# Patient Record
Sex: MALE | Race: BLACK OR AFRICAN AMERICAN | URBAN - METROPOLITAN AREA
[De-identification: names, ages, dates, MRNs, and addresses within clinical notes are randomized per-mention and may not be internally consistent; named-entity substitution may affect disease eponyms.]

---

## 2017-07-19 ENCOUNTER — ED HISTORICAL/CONVERTED ENCOUNTER (OUTPATIENT)
Dept: OTHER | Age: 62
End: 2017-07-19

## 2017-09-07 ENCOUNTER — ED HISTORICAL/CONVERTED ENCOUNTER (OUTPATIENT)
Dept: OTHER | Age: 62
End: 2017-09-07

## 2022-11-03 ENCOUNTER — APPOINTMENT (OUTPATIENT)
Dept: GENERAL RADIOLOGY | Age: 67
End: 2022-11-03
Attending: STUDENT IN AN ORGANIZED HEALTH CARE EDUCATION/TRAINING PROGRAM
Payer: OTHER GOVERNMENT

## 2022-11-03 ENCOUNTER — APPOINTMENT (OUTPATIENT)
Dept: CT IMAGING | Age: 67
End: 2022-11-03
Attending: STUDENT IN AN ORGANIZED HEALTH CARE EDUCATION/TRAINING PROGRAM
Payer: OTHER GOVERNMENT

## 2022-11-03 ENCOUNTER — HOSPITAL ENCOUNTER (OUTPATIENT)
Age: 67
Setting detail: OBSERVATION
Discharge: HOME | End: 2022-11-04
Attending: STUDENT IN AN ORGANIZED HEALTH CARE EDUCATION/TRAINING PROGRAM | Admitting: INTERNAL MEDICINE
Payer: OTHER GOVERNMENT

## 2022-11-03 ENCOUNTER — APPOINTMENT (OUTPATIENT)
Dept: NON INVASIVE DIAGNOSTICS | Age: 67
End: 2022-11-03
Attending: STUDENT IN AN ORGANIZED HEALTH CARE EDUCATION/TRAINING PROGRAM
Payer: OTHER GOVERNMENT

## 2022-11-03 DIAGNOSIS — I48.0 PAROXYSMAL ATRIAL FIBRILLATION (HCC): Primary | ICD-10-CM

## 2022-11-03 PROBLEM — I48.91 ATRIAL FIBRILLATION WITH RAPID VENTRICULAR RESPONSE (HCC): Status: ACTIVE | Noted: 2022-11-03

## 2022-11-03 LAB
ALBUMIN SERPL-MCNC: 3.4 G/DL (ref 3.5–5)
ALBUMIN/GLOB SERPL: 1 {RATIO} (ref 1.1–2.2)
ALP SERPL-CCNC: 77 U/L (ref 45–117)
ALT SERPL-CCNC: 25 U/L (ref 12–78)
ANION GAP SERPL CALC-SCNC: 4 MMOL/L (ref 5–15)
AST SERPL W P-5'-P-CCNC: 24 U/L (ref 15–37)
ATRIAL RATE: 131 BPM
BASOPHILS # BLD: 0 K/UL (ref 0–0.1)
BASOPHILS NFR BLD: 1 % (ref 0–1)
BILIRUB SERPL-MCNC: 0.2 MG/DL (ref 0.2–1)
BUN SERPL-MCNC: 20 MG/DL (ref 6–20)
BUN/CREAT SERPL: 19 (ref 12–20)
CA-I BLD-MCNC: 8.9 MG/DL (ref 8.5–10.1)
CALCULATED R AXIS, ECG10: 12 DEGREES
CALCULATED T AXIS, ECG11: -149 DEGREES
CHLORIDE SERPL-SCNC: 104 MMOL/L (ref 97–108)
CK SERPL-CCNC: 149 U/L (ref 39–308)
CO2 SERPL-SCNC: 31 MMOL/L (ref 21–32)
CREAT SERPL-MCNC: 1.06 MG/DL (ref 0.7–1.3)
D DIMER PPP FEU-MCNC: 5.08 UG/ML(FEU)
DIAGNOSIS, 93000: NORMAL
DIFFERENTIAL METHOD BLD: ABNORMAL
EOSINOPHIL # BLD: 0.2 K/UL (ref 0–0.4)
EOSINOPHIL NFR BLD: 3 % (ref 0–7)
ERYTHROCYTE [DISTWIDTH] IN BLOOD BY AUTOMATED COUNT: 14.7 % (ref 11.5–14.5)
GLOBULIN SER CALC-MCNC: 3.5 G/DL (ref 2–4)
GLUCOSE SERPL-MCNC: 127 MG/DL (ref 65–100)
HCT VFR BLD AUTO: 39.6 % (ref 36.6–50.3)
HGB BLD-MCNC: 13.2 G/DL (ref 12.1–17)
IMM GRANULOCYTES # BLD AUTO: 0 K/UL (ref 0–0.04)
IMM GRANULOCYTES NFR BLD AUTO: 0 % (ref 0–0.5)
INR PPP: 1 (ref 0.9–1.1)
LYMPHOCYTES # BLD: 2.3 K/UL (ref 0.8–3.5)
LYMPHOCYTES NFR BLD: 42 % (ref 12–49)
MAGNESIUM SERPL-MCNC: 2.2 MG/DL (ref 1.6–2.4)
MCH RBC QN AUTO: 31.7 PG (ref 26–34)
MCHC RBC AUTO-ENTMCNC: 33.3 G/DL (ref 30–36.5)
MCV RBC AUTO: 95.2 FL (ref 80–99)
MONOCYTES # BLD: 0.7 K/UL (ref 0–1)
MONOCYTES NFR BLD: 13 % (ref 5–13)
NEUTS SEG # BLD: 2.3 K/UL (ref 1.8–8)
NEUTS SEG NFR BLD: 41 % (ref 32–75)
NRBC # BLD: 0 K/UL (ref 0–0.01)
NRBC BLD-RTO: 0 PER 100 WBC
PLATELET # BLD AUTO: 324 K/UL (ref 150–400)
PMV BLD AUTO: 9.5 FL (ref 8.9–12.9)
POTASSIUM SERPL-SCNC: 4.1 MMOL/L (ref 3.5–5.1)
PROT SERPL-MCNC: 6.9 G/DL (ref 6.4–8.2)
PROTHROMBIN TIME: 13.6 SEC (ref 11.9–14.6)
Q-T INTERVAL, ECG07: 278 MS
QRS DURATION, ECG06: 100 MS
QTC CALCULATION (BEZET), ECG08: 410 MS
RBC # BLD AUTO: 4.16 M/UL (ref 4.1–5.7)
SODIUM SERPL-SCNC: 139 MMOL/L (ref 136–145)
TROPONIN-HIGH SENSITIVITY: 72 NG/L (ref 0–76)
TROPONIN-HIGH SENSITIVITY: 74 NG/L (ref 0–76)
VENTRICULAR RATE, ECG03: 131 BPM
WBC # BLD AUTO: 5.6 K/UL (ref 4.1–11.1)

## 2022-11-03 PROCEDURE — 96372 THER/PROPH/DIAG INJ SC/IM: CPT

## 2022-11-03 PROCEDURE — 80053 COMPREHEN METABOLIC PANEL: CPT

## 2022-11-03 PROCEDURE — 93970 EXTREMITY STUDY: CPT

## 2022-11-03 PROCEDURE — 84484 ASSAY OF TROPONIN QUANT: CPT

## 2022-11-03 PROCEDURE — 83735 ASSAY OF MAGNESIUM: CPT

## 2022-11-03 PROCEDURE — 71275 CT ANGIOGRAPHY CHEST: CPT

## 2022-11-03 PROCEDURE — 96360 HYDRATION IV INFUSION INIT: CPT

## 2022-11-03 PROCEDURE — 82550 ASSAY OF CK (CPK): CPT

## 2022-11-03 PROCEDURE — 85610 PROTHROMBIN TIME: CPT

## 2022-11-03 PROCEDURE — 36415 COLL VENOUS BLD VENIPUNCTURE: CPT

## 2022-11-03 PROCEDURE — G0378 HOSPITAL OBSERVATION PER HR: HCPCS

## 2022-11-03 PROCEDURE — 71045 X-RAY EXAM CHEST 1 VIEW: CPT

## 2022-11-03 PROCEDURE — 85025 COMPLETE CBC W/AUTO DIFF WBC: CPT

## 2022-11-03 PROCEDURE — 85379 FIBRIN DEGRADATION QUANT: CPT

## 2022-11-03 PROCEDURE — 96361 HYDRATE IV INFUSION ADD-ON: CPT

## 2022-11-03 PROCEDURE — 93005 ELECTROCARDIOGRAM TRACING: CPT

## 2022-11-03 PROCEDURE — 74011250636 HC RX REV CODE- 250/636: Performed by: STUDENT IN AN ORGANIZED HEALTH CARE EDUCATION/TRAINING PROGRAM

## 2022-11-03 PROCEDURE — 74011000636 HC RX REV CODE- 636: Performed by: STUDENT IN AN ORGANIZED HEALTH CARE EDUCATION/TRAINING PROGRAM

## 2022-11-03 PROCEDURE — 74011000250 HC RX REV CODE- 250: Performed by: STUDENT IN AN ORGANIZED HEALTH CARE EDUCATION/TRAINING PROGRAM

## 2022-11-03 PROCEDURE — 74011250637 HC RX REV CODE- 250/637: Performed by: STUDENT IN AN ORGANIZED HEALTH CARE EDUCATION/TRAINING PROGRAM

## 2022-11-03 PROCEDURE — 99285 EMERGENCY DEPT VISIT HI MDM: CPT

## 2022-11-03 RX ORDER — ACETAMINOPHEN 650 MG/1
650 SUPPOSITORY RECTAL
Status: DISCONTINUED | OUTPATIENT
Start: 2022-11-03 | End: 2022-11-04 | Stop reason: HOSPADM

## 2022-11-03 RX ORDER — SODIUM CHLORIDE 0.9 % (FLUSH) 0.9 %
5-40 SYRINGE (ML) INJECTION EVERY 8 HOURS
Status: DISCONTINUED | OUTPATIENT
Start: 2022-11-03 | End: 2022-11-04 | Stop reason: HOSPADM

## 2022-11-03 RX ORDER — SODIUM CHLORIDE 0.9 % (FLUSH) 0.9 %
5-40 SYRINGE (ML) INJECTION AS NEEDED
Status: DISCONTINUED | OUTPATIENT
Start: 2022-11-03 | End: 2022-11-04 | Stop reason: HOSPADM

## 2022-11-03 RX ORDER — LISINOPRIL 20 MG/1
20 TABLET ORAL DAILY
Status: DISCONTINUED | OUTPATIENT
Start: 2022-11-04 | End: 2022-11-04 | Stop reason: HOSPADM

## 2022-11-03 RX ORDER — METOPROLOL TARTRATE 50 MG/1
50 TABLET ORAL DAILY
Status: DISCONTINUED | OUTPATIENT
Start: 2022-11-03 | End: 2022-11-04

## 2022-11-03 RX ORDER — ENOXAPARIN SODIUM 100 MG/ML
40 INJECTION SUBCUTANEOUS DAILY
Status: DISCONTINUED | OUTPATIENT
Start: 2022-11-03 | End: 2022-11-04 | Stop reason: HOSPADM

## 2022-11-03 RX ORDER — BENZONATATE 100 MG/1
100 CAPSULE ORAL
COMMUNITY

## 2022-11-03 RX ORDER — BENZONATATE 100 MG/1
100 CAPSULE ORAL
Status: DISCONTINUED | OUTPATIENT
Start: 2022-11-03 | End: 2022-11-04 | Stop reason: HOSPADM

## 2022-11-03 RX ORDER — ONDANSETRON 2 MG/ML
4 INJECTION INTRAMUSCULAR; INTRAVENOUS
Status: DISCONTINUED | OUTPATIENT
Start: 2022-11-03 | End: 2022-11-04 | Stop reason: HOSPADM

## 2022-11-03 RX ORDER — POLYETHYLENE GLYCOL 3350 17 G/17G
17 POWDER, FOR SOLUTION ORAL DAILY PRN
Status: DISCONTINUED | OUTPATIENT
Start: 2022-11-03 | End: 2022-11-04 | Stop reason: HOSPADM

## 2022-11-03 RX ORDER — HYDRALAZINE HYDROCHLORIDE 20 MG/ML
20 INJECTION INTRAMUSCULAR; INTRAVENOUS
Status: DISCONTINUED | OUTPATIENT
Start: 2022-11-03 | End: 2022-11-04 | Stop reason: HOSPADM

## 2022-11-03 RX ORDER — ACETAMINOPHEN 325 MG/1
650 TABLET ORAL
Status: DISCONTINUED | OUTPATIENT
Start: 2022-11-03 | End: 2022-11-04 | Stop reason: HOSPADM

## 2022-11-03 RX ORDER — METOPROLOL TARTRATE 50 MG/1
50 TABLET ORAL
COMMUNITY
Start: 2022-09-09 | End: 2022-11-04

## 2022-11-03 RX ORDER — LISINOPRIL 40 MG/1
20 TABLET ORAL
COMMUNITY
Start: 2022-09-09

## 2022-11-03 RX ORDER — DILTIAZEM HYDROCHLORIDE 5 MG/ML
5 INJECTION INTRAVENOUS
Status: ACTIVE | OUTPATIENT
Start: 2022-11-03 | End: 2022-11-04

## 2022-11-03 RX ORDER — ONDANSETRON 4 MG/1
4 TABLET, ORALLY DISINTEGRATING ORAL
Status: DISCONTINUED | OUTPATIENT
Start: 2022-11-03 | End: 2022-11-04 | Stop reason: HOSPADM

## 2022-11-03 RX ADMIN — SODIUM CHLORIDE 1000 ML: 9 INJECTION, SOLUTION INTRAVENOUS at 03:14

## 2022-11-03 RX ADMIN — ENOXAPARIN SODIUM 40 MG: 100 INJECTION SUBCUTANEOUS at 10:13

## 2022-11-03 RX ADMIN — IOPAMIDOL 100 ML: 755 INJECTION, SOLUTION INTRAVENOUS at 07:31

## 2022-11-03 RX ADMIN — SODIUM CHLORIDE, PRESERVATIVE FREE 10 ML: 5 INJECTION INTRAVENOUS at 22:00

## 2022-11-03 RX ADMIN — ACETAMINOPHEN 650 MG: 325 TABLET ORAL at 10:13

## 2022-11-03 RX ADMIN — METOPROLOL TARTRATE 50 MG: 50 TABLET, FILM COATED ORAL at 13:57

## 2022-11-03 RX ADMIN — SODIUM CHLORIDE, PRESERVATIVE FREE 10 ML: 5 INJECTION INTRAVENOUS at 08:50

## 2022-11-03 RX ADMIN — SODIUM CHLORIDE 1000 ML: 9 INJECTION, SOLUTION INTRAVENOUS at 06:05

## 2022-11-03 RX ADMIN — SODIUM CHLORIDE, PRESERVATIVE FREE 10 ML: 5 INJECTION INTRAVENOUS at 20:34

## 2022-11-03 NOTE — CONSULTS
Consult    NAME: Kwame Ely   :  1955   MRN:  [id number]     Date/Time:  11/3/2022 1:36 PM    Patient PCP: Jennifer Pereyra MD  ________________________________________________________________________      Subjective:   CHIEF COMPLAINT: Palpitations. HISTORY OF PRESENT ILLNESS:   Patient stated that he was asleep and I experience pounding and fluttering and was aware that this could be his atrial fibrillation as he had a similar episode about a year and a half ago and was seen at [facility].  He was given Eliquis but he had a GI bleed and therefore Eliquis has been discontinued. He is fairly active person and denies any chest tightness motor deficit nausea vomiting or diaphoresis. Past Medical History:   Diagnosis Date    A-fib [facility])     Chronic obstructive pulmonary disease ([facility] [address]     Hypertension       History reviewed. No pertinent surgical history. No Known Allergies   Meds:  See below  Social History     Tobacco Use    Smoking status: Some Days     Types: Cigarettes    Smokeless tobacco: Never   Substance Use Topics    Alcohol use: Not Currently   Patient smokes half pack a day and takes a very seldom social drink and does use marijuana. History reviewed. No pertinent family history. FAMILY HISTORY: Mother  at the age of 79 and she had diabetes and had some kind of back surgery and subsequently she . He did not know much about his father. PERSONAL HISTORY : Patient is  and lives with a girlfriend and that he has 7 children from 5 PM and and he works as a .     REVIEW OF SYSTEMS:     []         Unable to obtain  ROS due to ---   [x]         Total of 12 systems reviewed as follows:    Constitutional: negative fever, negative chills, negative weight loss  Eyes:   negative visual changes  ENT:   negative sore throat, tongue or lip swelling  Respiratory:  negative cough, negative dyspnea  Cards:              negative for chest pain, positive for palpitations,   GI:   negative for nausea, vomiting, diarrhea, and abdominal pain  Genitourinary: negative for frequency, dysuria  Integument:  negative for rash   Hematologic:  negative for easy bruising and gum/nose bleeding  Musculoskel: negative for myalgias,  back pain  Neurological:  negative for headaches, dizziness, vertigo, weakness  Behavl/Psych: negative for feelings of anxiety, depression     Pertinent Positives include :    Objective:      Physical Exam:    Last 24hrs VS reviewed since prior progress note. Most recent are:    Visit Vitals  BP (!) 142/82 (BP 1 Location: Right upper arm, BP Patient Position: At rest)   Pulse 93   Temp 98.1 °F (36.7 °C)   Resp 17   Ht 5' 7\" (1.702 m)   Wt 77.1 kg (170 lb)   SpO2 97%   BMI 26.63 kg/m²       Intake/Output Summary (Last 24 hours) at 11/3/2022 1336  Last data filed at 11/3/2022 0414  Gross per 24 hour   Intake 1000 ml   Output --   Net 1000 ml        General Appearance: Well developed, well nourished, alert & oriented x 3,    no acute distress. Ears/Nose/Mouth/Throat: Pupils equal and round, Hearing grossly normal.  Neck: Supple. JVP within normal limits. Carotids good upstrokes, with no bruit. Chest: Lungs clear to auscultation bilaterally. Cardiovascular: Regular rate and rhythm, S1S2 normal, no murmur, rubs, gallops. Abdomen: Soft, non-tender, bowel sounds are active. No organomegaly. Extremities: No edema bilaterally. Femoral pulses +2, Distal Pulses +1. Skin: Warm and dry. Neuro: CN II-XII grossly intact, Strength and sensation grossly intact. []         Post-cath site without hematoma, bruit, tenderness, or thrill. Distal pulses intact. Data:      Telemetry: Sinus tachycardia    EKG:  []  No new EKG for review. Prior to Admission medications    Medication Sig Start Date End Date Taking?  Authorizing Provider   lisinopriL (PRINIVIL, ZESTRIL) 40 mg tablet 20 mg. 9/9/22  Yes Other, MD Puneet   metoprolol tartrate (LOPRESSOR) 50 mg tablet 50 mg. 9/9/22  Yes Other, MD Puneet   benzonatate (TESSALON) 100 mg capsule Take 100 mg by mouth three (3) times daily as needed for Cough. Yes Other, MD Puneet       Recent Results (from the past 24 hour(s))   EKG, 12 LEAD, INITIAL    Collection Time: 11/03/22  2:57 AM   Result Value Ref Range    Ventricular Rate 131 BPM    Atrial Rate 131 BPM    QRS Duration 100 ms    Q-T Interval 278 ms    QTC Calculation (Bezet) 410 ms    Calculated R Axis 12 degrees    Calculated T Axis -149 degrees    Diagnosis       Atrial fibrillation with rapid ventricular response  Voltage criteria for left ventricular hypertrophy  Cannot rule out Septal infarct , age undetermined  ST & T wave abnormality, consider inferolateral ischemia  Abnormal ECG  When compared with ECG of 07-SEP-2017 13:32,  Atrial fibrillation has replaced Sinus rhythm  Vent. rate has increased BY  44 BPM  Minimal criteria for Septal infarct are now Present  ST now depressed in Inferior leads  Confirmed by DORIAN FINCH, [facility] ([id number]) on 11/3/2022 12:32:49 PM     CBC WITH AUTOMATED DIFF    Collection Time: 11/03/22  3:09 AM   Result Value Ref Range    WBC 5.6 4.1 - 11.1 K/uL    RBC 4.16 4.10 - 5.70 M/uL    HGB 13.2 12.1 - 17.0 g/dL    HCT 39.6 36.6 - 50.3 %    MCV 95.2 80.0 - 99.0 FL    MCH 31.7 26.0 - 34.0 PG    MCHC 33.3 30.0 - 36.5 g/dL    RDW 14.7 (H) 11.5 - 14.5 %    PLATELET 835 468 - 173 K/uL    MPV 9.5 8.9 - 12.9 FL    NRBC 0.0 0.0  WBC    ABSOLUTE NRBC 0.00 0.00 - 0.01 K/uL    NEUTROPHILS 41 32 - 75 %    LYMPHOCYTES 42 12 - 49 %    MONOCYTES 13 5 - 13 %    EOSINOPHILS 3 0 - 7 %    BASOPHILS 1 0 - 1 %    IMMATURE GRANULOCYTES 0 0 - 0.5 %    ABS. NEUTROPHILS 2.3 1.8 - 8.0 K/UL    ABS. LYMPHOCYTES 2.3 0.8 - 3.5 K/UL    ABS. MONOCYTES 0.7 0.0 - 1.0 K/UL    ABS. EOSINOPHILS 0.2 0.0 - 0.4 K/UL    ABS. BASOPHILS 0.0 0.0 - 0.1 K/UL    ABS. IMM.  GRANS. 0.0 0.00 - 0.04 K/UL    DF AUTOMATED     METABOLIC PANEL, COMPREHENSIVE    Collection Time: 11/03/22  3:09 AM   Result Value Ref Range    Sodium 139 136 - 145 mmol/L    Potassium 4.1 3.5 - 5.1 mmol/L    Chloride 104 97 - 108 mmol/L    CO2 31 21 - 32 mmol/L    Anion gap 4 (L) 5 - 15 mmol/L    Glucose 127 (H) 65 - 100 mg/dL    BUN 20 6 - 20 mg/dL    Creatinine 1.06 0.70 - 1.30 mg/dL    BUN/Creatinine ratio 19 12 - 20      eGFR >60 >60 ml/min/1.73m2    Calcium 8.9 8.5 - 10.1 mg/dL    Bilirubin, total 0.2 0.2 - 1.0 mg/dL    AST (SGOT) 24 15 - 37 U/L    ALT (SGPT) 25 12 - 78 U/L    Alk. phosphatase 77 45 - 117 U/L    Protein, total 6.9 6.4 - 8.2 g/dL    Albumin 3.4 (L) 3.5 - 5.0 g/dL    Globulin 3.5 2.0 - 4.0 g/dL    A-G Ratio 1.0 (L) 1.1 - 2.2     TROPONIN-HIGH SENSITIVITY    Collection Time: 11/03/22  3:09 AM   Result Value Ref Range    Troponin-High Sensitivity 74 0 - 76 ng/L   MAGNESIUM    Collection Time: 11/03/22  3:09 AM   Result Value Ref Range    Magnesium 2.2 1.6 - 2.4 mg/dL   TROPONIN-HIGH SENSITIVITY    Collection Time: 11/03/22  4:54 AM   Result Value Ref Range    Troponin-High Sensitivity 72 0 - 76 ng/L   D DIMER    Collection Time: 11/03/22  6:11 AM   Result Value Ref Range    D DIMER 5.08 (H) <0.50 ug/ml(FEU)   CK    Collection Time: 11/03/22  9:02 AM   Result Value Ref Range     39 - 308 U/L   PROTHROMBIN TIME + INR    Collection Time: 11/03/22  9:02 AM   Result Value Ref Range    Prothrombin time 13.6 11.9 - 14.6 sec    INR 1.0 0.9 - 1.1           Assessment:   Patient has had an episode of atrial fibrillation with rapid ventricular response but now is converted to sinus rhythm. History of atrial fibrillation and had a GI bleed and therefore is not on anticoagulation. Hypertension. COPD. Tobacco use. Plan:   I will check echocardiogram and then serial EKG and enzymes. I discussed with the patient about option of atrial fibrillation ablation and subsequently watchman device insertion and patient is interested and is willing to consider that.   I discussed with electrophysiologist Dr. Junito Escalona and I will consult to him. Thank you.         []        High complexity decision making was performed    John Christensen MD

## 2022-11-03 NOTE — PROGRESS NOTES
Reason for Admission:  Paroxysmal AFIB                     RUR Score:    N/A                 Plan for utilizing home health:  Not at this time        PCP: First and Last name: Merced Pittman MD     Name of Practice:    Are you a current patient: Yes/No:    Approximate date of last visit: 2 months ago   Can you participate in a virtual visit with your PCP:                     Current Advanced Directive/Advance Care Plan: Full Code      Healthcare Decision Maker:   Click here to complete [address] including selection of the Healthcare Decision Maker Relationship (ie \"Primary\")           Vazquez Flores, wife, [phone]                  Transition of Care Plan:     Met f/f with Pt, he confirmed that the information on the face sheet is correct. Pt stated that he lives with his wife. Pt stated no HH, no DME and independent with ADL. Pt stated that he uses the [facility]. Pt stated that family will take him home when he is D/C. CM dispo: home with no needs at this time.

## 2022-11-03 NOTE — PROGRESS NOTES
SON)/ [place] Outpatient Observation Notice (Mike Torres) provided to patient/representative with verbal explanation of the notice. Time allotted for questions regarding the notice. Patient /representative provided a completed copy of the SON/VOON notice. Copy placed on bedside chart.

## 2022-11-03 NOTE — ED TRIAGE NOTES
Chest pain with shortness of breath started 0200 after eating a popcicle. Afib with rvr for ems. Hx of  afib .  Pt alert and oriented

## 2022-11-03 NOTE — ED PROVIDER NOTES
[facility]  EMERGENCY DEPARTMENT ENCOUNTER NOTE        Date: 11/3/2022  Patient Name: Ramonita Ward      History of Presenting Illness     Chief Complaint   Patient presents with    Chest Pain    Shortness of Breath       History Provided By: Patient    HPI: Ramonita Ward, 79 y.o. male with a history of paroxysmal atrial fibrillation, COPD, and HTN who comes to the ED with palpitation. Patient reports that he went to the [place] today and was seen by his cardiologist and had negative work-up including EKG and echo. He went home and today suddenly started having palpitation. Following that around 2 AM started having chest pain shortness of breath that has resolved. Last time he had something similar is over a year ago. Moderate in severity, no known trigger, aggravating relieving factors without association symptoms. He is denying recent illnesses, nausea, vomiting, or diarrhea. There are no other complaints, changes, or physical findings at this time. PCP: No primary care provider on file. Past History     Past Medical History:  Past Medical History:   Diagnosis Date    A-fib ([facility] [address]     Chronic obstructive pulmonary disease ([facility] [address]     Hypertension        Past Surgical History:  History reviewed. No pertinent surgical history. Family History:  History reviewed. No pertinent family history. Social History:  Social History     Tobacco Use    Smoking status: Some Days     Types: Cigarettes    Smokeless tobacco: Never   Substance Use Topics    Alcohol use: Not Currently    Drug use: Yes     Types: Marijuana       Allergies:  No Known Allergies      Review of Systems     Review of Systems    A 10 point review of system was performed and was negative except as noted above in HPI    Physical Exam     Physical Exam  Vitals and nursing note reviewed. Constitutional:       General: He is not in acute distress.      Appearance: He is not ill-appearing, toxic-appearing or diaphoretic. HENT:      Head: Normocephalic and atraumatic. Cardiovascular:      Rate and Rhythm: Tachycardia present. Rhythm irregular. Heart sounds: Normal heart sounds. Pulmonary:      Effort: Pulmonary effort is normal.      Breath sounds: Normal breath sounds. Abdominal:      Palpations: Abdomen is soft. Tenderness: There is no abdominal tenderness. Musculoskeletal:      Cervical back: Normal range of motion and neck supple. Right lower leg: No tenderness. No edema. Left lower leg: No tenderness. No edema. Skin:     General: Skin is warm and dry. Neurological:      Mental Status: He is alert and oriented to person, place, and time. Lab and Diagnostic Study Results     Labs -   No results found for this or any previous visit (from the past 12 hour(s)). Radiologic Studies -   [unfilled]  CT Results  (Last 48 hours)      None          CXR Results  (Last 48 hours)      None            Medical Decision Making and ED Course   - I am the first and primary provider for this patient AND AM THE PRIMARY PROVIDER OF RECORD. - I reviewed the vital signs, available nursing notes, past medical history, past surgical history, family history and social history. - Initial assessment performed. The patients presenting problems have been discussed, and the staff are in agreement with the care plan formulated and outlined with them. I have encouraged them to ask questions as they arise throughout their visit. Vital Signs-Reviewed the patient's vital signs. Patient Vitals for the past 24 hrs:   Temp Pulse Resp BP SpO2   11/03/22 0302 98.1 °F (36.7 °C) (!) 140 22 (!) 162/143 96 %       Records Reviewed: Nursing Notes    Provider Notes (Medical Decision Making):       ED Course as of 11/03/22 0821   Thu Nov 03, 2022   0300 Patient is presented to the ED with atrial fibrillation.   He does have history of paroxysmal A. fib in the past.  Is currently on metoprolol but is not on anticoagulation. Reportedly he was seen by his cardiologist today and had work-up which was negative. Coming with chest pain and shortness of breath we will get CBC, chemistry, troponin, magnesium, and chest x-ray. We will contact the patient cardiac monitor and reassess. [AA]   0305 EKG was done at 2:57 AM interpreted by me as atrial fibrillation with RVR rate 131, indeterminate SC, QRS and QTc within normal, normal axis, no specific ST elevation or depression, no signs of blocks [AA]   0620 Work-up so far and is negative. We will get D-dimer given the A. fib to rule out PE. [AA]   0800 D-dimer was elevated. Consequently CTA was obtained which was negative. Discussed with cardiologist.  Patient will be observed in the hospital. Natanael Idalia      ED Course User Index  [AA] Flora Mckeon MD         Procedures and Critical Care       Performed by: Elizabeth Cuevas MD  PROCEDURES:  Critical Care  Performed by: Flora Mckeon MD  Authorized by: Flora Mckeon MD     Critical care provider statement:     Critical care time (minutes):  35    Critical care time was exclusive of:  Separately billable procedures and treating other patients    Critical care was necessary to treat or prevent imminent or life-threatening deterioration of the following conditions: Dysrhythmia and dehydration.     Critical care was time spent personally by me on the following activities:  Development of treatment plan with patient or surrogate, discussions with consultants, evaluation of patient's response to treatment, examination of patient, obtaining history from patient or surrogate, ordering and performing treatments and interventions, ordering and review of laboratory studies, ordering and review of radiographic studies and re-evaluation of patient's condition    I assumed direction of critical care for this patient from another provider in my specialty: no      Care discussed with: admitting provider Diagnosis     Clinical Impression: No diagnosis found. Disposition     Disposition: Condition improved          Attestations: Carol Ellington MD    Please note that this dictation was completed with [employer], the computer voice recognition software. Quite often unanticipated grammatical, syntax, homophones, and other interpretive errors are inadvertently transcribed by the computer software. Please disregard these errors. Please excuse any errors that have escaped final proofreading. Thank you.

## 2022-11-03 NOTE — ED NOTES
Spoke with wife regarding patient status after verbal consent from patient given, she requests to be called with up dated plan of care.     Myra Morris [phone]

## 2022-11-03 NOTE — H&P
History and Physical    Patient: Guadalupe Merida MRN: [id number]  SSN: [id number]    YOB: 1955  Age: 79 y.o. Sex: male      Subjective:      Guadalupe Merida is a 79 y.o. male with a PMHx of atrial fibrillation not on AC, COPD, hypertension, and hx GI bleed required multiple transfusions presenting to the ED with a primary complaint of palpitations. Patient states symptoms began at approximately 0200 AM this morning. He reports associated diaphoresis and lightheadedness. Patient follows with the [place] and was evaluated by his cardiologist yesterday. He states an EKG and echocardiogram performed at the time were unremakble. Patient reports similar episode of palpitations about 1.5 years ago and states \"my heart was in fibrillation. \" Patient states he came to our hospital at the time and was cardioverted. Old records of encounter are not available. Denies any fever, chills, dizziness, visual changes, cough, SOB, chest pain, N/V, constipation, diarrhea, abdominal pain, or lower extremity edema. In the ED, tachycardic with HR in 140s and hypertensive. EKG showing atrial fibrillation with RVR. Troponin negative x2. Patient spontaneously converted into normal sinus rhythm in the ED. Significant labs showing d-dimer of 5.08. CTA chest negative for pulmonary embolism or any other acute process. Patient admitted for further workup. Past Medical History:   Diagnosis Date    A-fib [facility])     Chronic obstructive pulmonary disease ([facility] [address]     Hypertension      History reviewed. No pertinent surgical history. History reviewed. No pertinent family history. Social History     Tobacco Use    Smoking status: Some Days     Types: Cigarettes    Smokeless tobacco: Never   Substance Use Topics    Alcohol use: Not Currently      Prior to Admission medications    Medication Sig Start Date End Date Taking?  Authorizing Provider   lisinopriL (PRINIVIL, ZESTRIL) 40 mg tablet 20 mg. 9/9/22  Yes Other, MD Puneet metoprolol tartrate (LOPRESSOR) 50 mg tablet 50 mg. 9/9/22  Yes Other, MD Puneet   benzonatate (TESSALON) 100 mg capsule Take 100 mg by mouth three (3) times daily as needed for Cough. Yes Other, MD Puneet        No Known Allergies    Review of Systems:  Constitutional: No fevers, No chills, No fatigue, No weakness  Eyes: No visual disturbance  ENT: No nasal congestion, No sore throat  Respiratory: No cough, No sputum, No wheezing, No SOB  Cardiovascular: No chest pain, No lower extremity edema, + diaphoresis, + Palpitations   Gastrointestinal: No nausea, No vomiting, No diarrhea, No constipation, No abdominal pain  Genitourinary: No frequency, No dysuria, No hematuria  Integument/Breast: No rash, No skin lesion(s), No dryness  Musculoskeletal: No arthralgias, No neck pain, No back pain  Neurological: No headaches, No dizziness, No confusion,  + lightheadedness  Behavioral/Psychiatric: No anxiety, No depression      Objective:     Vitals:    11/03/22 0722 11/03/22 0743 11/03/22 0859 11/03/22 1002   BP: 126/82 (!) 133/96 (!) 144/83 135/89   Pulse: 88 92 85 88   Resp: 17 17 18 20   Temp:       SpO2: 95% 97% 98% 97%   Weight:       Height:            Physical Exam:  General: alert, cooperative, no distress  Eye: conjunctivae/corneas clear. PERRL, EOM's intact. Throat and Neck: normal and no erythema or exudates noted. No mass   Lung: clear to auscultation bilaterally. Room air. Heart: Regular rate. Irregular rhythm, +S1/S2. No murmur or gallop. No JVDD. Abdomen: soft, non-tender. Bowel sounds normal. No masses,  Extremities:  Able to move all extremities normal, atraumatic. No calf tenderness. No peripheral edema. Skin: Warm, dry, intact. Neurologic: AOx3. Cranial nerves 2-12 and sensation grossly intact.   Psychiatric: non focal    Recent Results (from the past 24 hour(s))   CBC WITH AUTOMATED DIFF    Collection Time: 11/03/22  3:09 AM   Result Value Ref Range    WBC 5.6 4.1 - 11.1 K/uL    RBC 4.16 4.10 - 5.70 M/uL    HGB 13.2 12.1 - 17.0 g/dL    HCT 39.6 36.6 - 50.3 %    MCV 95.2 80.0 - 99.0 FL    MCH 31.7 26.0 - 34.0 PG    MCHC 33.3 30.0 - 36.5 g/dL    RDW 14.7 (H) 11.5 - 14.5 %    PLATELET 257 260 - 596 K/uL    MPV 9.5 8.9 - 12.9 FL    NRBC 0.0 0.0  WBC    ABSOLUTE NRBC 0.00 0.00 - 0.01 K/uL    NEUTROPHILS 41 32 - 75 %    LYMPHOCYTES 42 12 - 49 %    MONOCYTES 13 5 - 13 %    EOSINOPHILS 3 0 - 7 %    BASOPHILS 1 0 - 1 %    IMMATURE GRANULOCYTES 0 0 - 0.5 %    ABS. NEUTROPHILS 2.3 1.8 - 8.0 K/UL    ABS. LYMPHOCYTES 2.3 0.8 - 3.5 K/UL    ABS. MONOCYTES 0.7 0.0 - 1.0 K/UL    ABS. EOSINOPHILS 0.2 0.0 - 0.4 K/UL    ABS. BASOPHILS 0.0 0.0 - 0.1 K/UL    ABS. IMM. GRANS. 0.0 0.00 - 0.04 K/UL    DF AUTOMATED     METABOLIC PANEL, COMPREHENSIVE    Collection Time: 11/03/22  3:09 AM   Result Value Ref Range    Sodium 139 136 - 145 mmol/L    Potassium 4.1 3.5 - 5.1 mmol/L    Chloride 104 97 - 108 mmol/L    CO2 31 21 - 32 mmol/L    Anion gap 4 (L) 5 - 15 mmol/L    Glucose 127 (H) 65 - 100 mg/dL    BUN 20 6 - 20 mg/dL    Creatinine 1.06 0.70 - 1.30 mg/dL    BUN/Creatinine ratio 19 12 - 20      eGFR >60 >60 ml/min/1.73m2    Calcium 8.9 8.5 - 10.1 mg/dL    Bilirubin, total 0.2 0.2 - 1.0 mg/dL    AST (SGOT) 24 15 - 37 U/L    ALT (SGPT) 25 12 - 78 U/L    Alk.  phosphatase 77 45 - 117 U/L    Protein, total 6.9 6.4 - 8.2 g/dL    Albumin 3.4 (L) 3.5 - 5.0 g/dL    Globulin 3.5 2.0 - 4.0 g/dL    A-G Ratio 1.0 (L) 1.1 - 2.2     TROPONIN-HIGH SENSITIVITY    Collection Time: 11/03/22  3:09 AM   Result Value Ref Range    Troponin-High Sensitivity 74 0 - 76 ng/L   MAGNESIUM    Collection Time: 11/03/22  3:09 AM   Result Value Ref Range    Magnesium 2.2 1.6 - 2.4 mg/dL   TROPONIN-HIGH SENSITIVITY    Collection Time: 11/03/22  4:54 AM   Result Value Ref Range    Troponin-High Sensitivity 72 0 - 76 ng/L   D DIMER    Collection Time: 11/03/22  6:11 AM   Result Value Ref Range    D DIMER 5.08 (H) <0.50 ug/ml(FEU)   CK    Collection Time: 11/03/22  9:02 AM   Result Value Ref Range     39 - 308 U/L   PROTHROMBIN TIME + INR    Collection Time: 11/03/22  9:02 AM   Result Value Ref Range    Prothrombin time 13.6 11.9 - 14.6 sec    INR 1.0 0.9 - 1.1         XR Results (maximum last 3): Results from [place] encounter on 11/03/22    XR CHEST PORT    Narrative  EXAM:  XR CHEST PORT    INDICATION: Chest pain. COMPARISON: Chest x-ray 9/7/2017. TECHNIQUE: Semiupright portable chest AP view    FINDINGS: Cardiac monitoring leads are noted. The cardiac silhouette is within  normal limits. The pulmonary vasculature is within normal limits. The lungs and pleural spaces are clear. The visualized bones and upper abdomen  are age-appropriate. Impression  No acute findings. CT Results (maximum last 3): Results from [place] encounter on 11/03/22    CTA CHEST W OR W WO CONT    Narrative  EXAM:  CTA CHEST W OR W WO CONT    INDICATION:   SOB, chest pain, elevated DDimer. R/o PE    COMPARISON: None. TECHNIQUE:  Precontrast  images were obtained to localize the volume for acquisition. Multislice helical CT arteriography was performed from the diaphragm to the  thoracic inlet during uneventful rapid bolus of 100 cc Isovue-370. Lung and soft  tissue windows were generated. Coronal and sagittal images were generated and  3D post processing consisting of coronal maximum intensity images was performed. CT dose reduction was achieved through use of a standardized protocol tailored  for this examination and automatic exposure control for dose modulation. FINDINGS:  CHEST:  THYROID: No nodule. MEDIASTINUM: No mass or lymphadenopathy. SHOSHANA: No mass or lymphadenopathy. THORACIC AORTA: No dissection or aneurysm. MAIN PULMONARY ARTERY: Normal in caliber. TRACHEA/BRONCHI: Patent. ESOPHAGUS: No wall thickening or dilatation. HEART: Normal in size. PLEURA: No effusion or pneumothorax.   LUNGS: Emphysematous changes are noted. No acute abnormality. INCIDENTALLY IMAGED UPPER ABDOMEN: Status post cholecystectomy. BONES: No destructive bone lesion. Impression  No acute process or evidence of pulmonary embolism. MRI Results (maximum last 3): No results found for this or any previous visit. Nuclear Medicine Results (maximum last 3): No results found for this or any previous visit. US Results (maximum last 3): No results found for this or any previous visit. Active Problems:    Atrial fibrillation with rapid ventricular response (HCC) (11/3/2022)        Assessment/Plan:     Atrial fibrillation with RVR  - EKG showing atrial fibrillation with RVR. - Troponin negative x2.   - Converted in NSR  - Cardiac monitoring  - Cardiology consult      Elevated d-dimer  - CTA chest negative for pulmonary embolism or any other acute process  - Will get duplex US lower ext     Hypertension  - IV hydralazine 20 mg PRN for systolic BP >873    COPD  - No acute exacerbation    Hx GI bleed  - Per pt, he required 13 transfusions. States EGD, colonoscopy, and MRI were all negative.        DVT Prophylaxis: Lovenox   Code Status: Full  POA    Total Time >55 minutes      Signed By: Rosie Diaz PA-C     November 3, 2022

## 2022-11-04 ENCOUNTER — APPOINTMENT (OUTPATIENT)
Dept: NON INVASIVE DIAGNOSTICS | Age: 67
End: 2022-11-04
Attending: INTERNAL MEDICINE
Payer: OTHER GOVERNMENT

## 2022-11-04 VITALS
TEMPERATURE: 98.3 F | WEIGHT: 166.01 LBS | HEIGHT: 67 IN | DIASTOLIC BLOOD PRESSURE: 96 MMHG | SYSTOLIC BLOOD PRESSURE: 160 MMHG | BODY MASS INDEX: 26.06 KG/M2 | OXYGEN SATURATION: 98 % | RESPIRATION RATE: 24 BRPM | HEART RATE: 74 BPM

## 2022-11-04 LAB
ATRIAL RATE: 68 BPM
CALCULATED P AXIS, ECG09: 70 DEGREES
CALCULATED R AXIS, ECG10: -9 DEGREES
CALCULATED T AXIS, ECG11: -146 DEGREES
DIAGNOSIS, 93000: NORMAL
ECHO AO ASC DIAM: 3.5 CM
ECHO AO ASCENDING AORTA INDEX: 1.87 CM/M2
ECHO AO ROOT DIAM: 3.6 CM
ECHO AO ROOT INDEX: 1.93 CM/M2
ECHO AV AREA PEAK VELOCITY: 3 CM2
ECHO AV AREA VTI: 3.4 CM2
ECHO AV AREA/BSA PEAK VELOCITY: 1.6 CM2/M2
ECHO AV AREA/BSA VTI: 1.8 CM2/M2
ECHO AV MEAN GRADIENT: 6 MMHG
ECHO AV MEAN VELOCITY: 1 M/S
ECHO AV PEAK GRADIENT: 13 MMHG
ECHO AV PEAK VELOCITY: 1.8 M/S
ECHO AV VELOCITY RATIO: 0.78
ECHO AV VTI: 29 CM
ECHO EST RA PRESSURE: 3 MMHG
ECHO LA AREA 2C: 17.2 CM2
ECHO LA AREA 4C: 21.5 CM2
ECHO LA DIAMETER INDEX: 1.98 CM/M2
ECHO LA DIAMETER: 3.7 CM
ECHO LA MAJOR AXIS: 5.4 CM
ECHO LA MINOR AXIS: 4.9 CM
ECHO LA TO AORTIC ROOT RATIO: 1.03
ECHO LA VOL BP: 61 ML (ref 18–58)
ECHO LA VOL/BSA BIPLANE: 33 ML/M2 (ref 16–34)
ECHO LV E' LATERAL VELOCITY: 7 CM/S
ECHO LV E' SEPTAL VELOCITY: 5 CM/S
ECHO LV EDV A2C: 60 ML
ECHO LV EDV A4C: 76 ML
ECHO LV EDV INDEX A4C: 41 ML/M2
ECHO LV EDV NDEX A2C: 32 ML/M2
ECHO LV EJECTION FRACTION A2C: 64 %
ECHO LV EJECTION FRACTION A4C: 57 %
ECHO LV EJECTION FRACTION BIPLANE: 61 % (ref 55–100)
ECHO LV ESV A2C: 22 ML
ECHO LV ESV A4C: 33 ML
ECHO LV ESV INDEX A2C: 12 ML/M2
ECHO LV ESV INDEX A4C: 18 ML/M2
ECHO LV FRACTIONAL SHORTENING: 31 % (ref 28–44)
ECHO LV INTERNAL DIMENSION DIASTOLE INDEX: 1.93 CM/M2
ECHO LV INTERNAL DIMENSION DIASTOLIC: 3.6 CM (ref 4.2–5.9)
ECHO LV INTERNAL DIMENSION SYSTOLIC INDEX: 1.34 CM/M2
ECHO LV INTERNAL DIMENSION SYSTOLIC: 2.5 CM
ECHO LV IVSD: 1.5 CM (ref 0.6–1)
ECHO LV MASS 2D: 201 G (ref 88–224)
ECHO LV MASS INDEX 2D: 107.5 G/M2 (ref 49–115)
ECHO LV POSTERIOR WALL DIASTOLIC: 1.5 CM (ref 0.6–1)
ECHO LV RELATIVE WALL THICKNESS RATIO: 0.83
ECHO LVOT AREA: 3.8 CM2
ECHO LVOT AV VTI INDEX: 0.9
ECHO LVOT DIAM: 2.2 CM
ECHO LVOT MEAN GRADIENT: 4 MMHG
ECHO LVOT PEAK GRADIENT: 8 MMHG
ECHO LVOT PEAK VELOCITY: 1.4 M/S
ECHO LVOT STROKE VOLUME INDEX: 53 ML/M2
ECHO LVOT SV: 99.2 ML
ECHO LVOT VTI: 26.1 CM
ECHO MV A VELOCITY: 1.03 M/S
ECHO MV AREA VTI: 2.7 CM2
ECHO MV E VELOCITY: 0.76 M/S
ECHO MV E/A RATIO: 0.74
ECHO MV E/E' LATERAL: 10.86
ECHO MV E/E' RATIO (AVERAGED): 13.03
ECHO MV E/E' SEPTAL: 15.2
ECHO MV LVOT VTI INDEX: 1.42
ECHO MV MAX VELOCITY: 1.4 M/S
ECHO MV MEAN GRADIENT: 2 MMHG
ECHO MV MEAN VELOCITY: 0.6 M/S
ECHO MV PEAK GRADIENT: 8 MMHG
ECHO MV REGURGITANT PEAK GRADIENT: 96 MMHG
ECHO MV REGURGITANT PEAK VELOCITY: 4.9 M/S
ECHO MV REGURGITANT VTIA: 113 CM
ECHO MV VTI: 37 CM
ECHO PULMONARY ARTERY SYSTOLIC PRESSURE (PASP): 3 MMHG
ECHO PV MAX VELOCITY: 0.9 M/S
ECHO PV PEAK GRADIENT: 3 MMHG
ECHO RA AREA 4C: 14.2 CM2
ECHO RA END SYSTOLIC VOLUME APICAL 4 CHAMBER INDEX BSA: 19 ML/M2
ECHO RA VOLUME BIPLANE METHOD OF DISKS: 36 ML
ECHO RA VOLUME INDEX BP: 19 ML/M2
ECHO RA VOLUME: 36 ML
ECHO RIGHT VENTRICULAR SYSTOLIC PRESSURE (RVSP): 36 MMHG
ECHO RV BASAL DIMENSION: 3.7 CM
ECHO RV MID DIMENSION: 2.3 CM
ECHO RV TAPSE: 1.8 CM (ref 1.7–?)
ECHO TV REGURGITANT MAX VELOCITY: 2.87 M/S
ECHO TV REGURGITANT PEAK GRADIENT: 33 MMHG
P-R INTERVAL, ECG05: 148 MS
Q-T INTERVAL, ECG07: 426 MS
QRS DURATION, ECG06: 104 MS
QTC CALCULATION (BEZET), ECG08: 452 MS
VENTRICULAR RATE, ECG03: 68 BPM

## 2022-11-04 PROCEDURE — 74011250637 HC RX REV CODE- 250/637: Performed by: STUDENT IN AN ORGANIZED HEALTH CARE EDUCATION/TRAINING PROGRAM

## 2022-11-04 PROCEDURE — 74011250637 HC RX REV CODE- 250/637: Performed by: INTERNAL MEDICINE

## 2022-11-04 PROCEDURE — G0378 HOSPITAL OBSERVATION PER HR: HCPCS

## 2022-11-04 PROCEDURE — 93005 ELECTROCARDIOGRAM TRACING: CPT

## 2022-11-04 PROCEDURE — 96372 THER/PROPH/DIAG INJ SC/IM: CPT

## 2022-11-04 PROCEDURE — 74011000250 HC RX REV CODE- 250: Performed by: STUDENT IN AN ORGANIZED HEALTH CARE EDUCATION/TRAINING PROGRAM

## 2022-11-04 PROCEDURE — 93306 TTE W/DOPPLER COMPLETE: CPT

## 2022-11-04 PROCEDURE — 74011250636 HC RX REV CODE- 250/636: Performed by: STUDENT IN AN ORGANIZED HEALTH CARE EDUCATION/TRAINING PROGRAM

## 2022-11-04 RX ORDER — LISINOPRIL 5 MG/1
5 TABLET ORAL DAILY
COMMUNITY

## 2022-11-04 RX ORDER — HYDROCHLOROTHIAZIDE 12.5 MG/1
12.5 TABLET ORAL DAILY
COMMUNITY

## 2022-11-04 RX ORDER — METOPROLOL SUCCINATE 100 MG/1
100 TABLET, EXTENDED RELEASE ORAL DAILY
Qty: 30 TABLET | Refills: 5 | Status: SHIPPED | OUTPATIENT
Start: 2022-11-04 | End: 2022-12-04

## 2022-11-04 RX ORDER — METOPROLOL TARTRATE 50 MG/1
100 TABLET ORAL DAILY
Status: DISCONTINUED | OUTPATIENT
Start: 2022-11-04 | End: 2022-11-04 | Stop reason: HOSPADM

## 2022-11-04 RX ADMIN — SODIUM CHLORIDE, PRESERVATIVE FREE 10 ML: 5 INJECTION INTRAVENOUS at 06:06

## 2022-11-04 RX ADMIN — LISINOPRIL 20 MG: 20 TABLET ORAL at 10:36

## 2022-11-04 RX ADMIN — ENOXAPARIN SODIUM 40 MG: 100 INJECTION SUBCUTANEOUS at 10:36

## 2022-11-04 RX ADMIN — METOPROLOL TARTRATE 100 MG: 50 TABLET, FILM COATED ORAL at 10:36

## 2022-11-04 NOTE — PROGRESS NOTES
Primary Nurse Cassi Green RN and Chloe Francisco RN performed a dual skin assessment on this patient No impairment noted. Miles score is 22.

## 2022-11-04 NOTE — PROGRESS NOTES
DC Plan: Home with spouse    Discharge plan of care/case management plan validated with provider's discharge order.

## 2022-11-04 NOTE — DISCHARGE SUMMARY
Physician Discharge Summary     Patient ID:    Ania Whittaker  [id number]  70 y.o.  1955    Admit date: 11/3/2022    Discharge date : 11/4/2022    Chronic Diagnoses:    Problem List as of 11/4/2022 Never Reviewed            Codes Class Noted - Resolved    Atrial fibrillation with rapid ventricular response [facility]) ICD-10-CM: I48.91  ICD-9-CM: 427.31  11/3/2022 - Present       22    Final Diagnoses:   Atrial fibrillation with rapid ventricular response ([facility] [address] [I48.91]    Reason for Hospitalization:  Afib with RVR  HTN  COPD  GERD      Hospital Course:     79 y.o. male with a PMHx of atrial fibrillation not on AC, COPD, hypertension, and hx GI bleed required multiple transfusions presenting to the ED with a primary complaint of palpitations. Patient states symptoms began at approximately 0200 AM this morning. He reports associated diaphoresis and lightheadedness. Patient follows with the [place] and was evaluated by his cardiologist yesterday. He states an EKG and echocardiogram performed at the time were unremakble. Patient reports similar episode of palpitations about 1.5 years ago and states \"my heart was in fibrillation. \" Patient states he came to our hospital at the time and was cardioverted. Old records of encounter are not available. Denies any fever, chills, dizziness, visual changes, cough, SOB, chest pain, N/V, constipation, diarrhea, abdominal pain, or lower extremity edema. In the ED, tachycardic with HR in 140s and hypertensive. EKG showing atrial fibrillation with RVR. Troponin negative x2. Patient spontaneously converted into normal sinus rhythm in the ED. Significant labs showing d-dimer of 5.08. CTA chest negative for pulmonary embolism or any other acute process. He was seen by EP, metoprolol increased to 100 mg daily, and was planned for discharge to see cardiology and EP as OP.             INSTRUCTIONS ON DISCHARGE     (1) please note that your dose of METOPROLOL was changed to 100mg daily     (2) please follow-up with Dr. Ludmila Reese - electrophysiologist in 1 week    Discharge Medications:   Current Discharge Medication List        START taking these medications    Details   metoprolol succinate (TOPROL-XL) 100 mg tablet Take 1 Tablet by mouth daily for 30 days. Qty: 30 Tablet, Refills: 5  Start date: 11/4/2022, End date: 12/4/2022           CONTINUE these medications which have NOT CHANGED    Details   hydroCHLOROthiazide (HYDRODIURIL) 12.5 mg tablet Take 12.5 mg by mouth daily. WITH 10 MG lisinopril  Indications: high blood pressure      !! lisinopriL (PRINIVIL, ZESTRIL) 5 mg tablet Take 5 mg by mouth daily. Indications: high blood pressure      !! lisinopriL (PRINIVIL, ZESTRIL) 40 mg tablet 20 mg.      benzonatate (TESSALON) 100 mg capsule Take 100 mg by mouth three (3) times daily as needed for Cough. !! - Potential duplicate medications found. Please discuss with provider. STOP taking these medications       metoprolol tartrate (LOPRESSOR) 50 mg tablet Comments:   Reason for Stopping: Follow up Care:    1. Arnulfo Pizano MD in 1-2 weeks. Please call to set up an appointment shortly after discharge. Diet:  Cardiac Diet    Disposition:  Home. Advanced Directive:   FULL x   DNR      Discharge Exam:  General: alert, cooperative, no distress  Eye: conjunctivae/corneas clear. PERRL, EOM's intact. Throat and Neck: normal and no erythema or exudates noted. No mass   Lung: clear to auscultation bilaterally. Room air. Heart: Regular rate. Irregular rhythm, +S1/S2. No murmur or gallop. No JVDD. Abdomen: soft, non-tender. Bowel sounds normal. No masses,  Extremities:  Able to move all extremities normal, atraumatic. No calf tenderness. No peripheral edema. Skin: Warm, dry, intact. Neurologic: AOx3. Cranial nerves 2-12 and sensation grossly intact.   Psychiatric: non focal    CONSULTATIONS: Cardiology    Significant Diagnostic Studies: Radiologic Studies -   Results from Hospital Encounter encounter on 11/03/22    XR CHEST PORT    Narrative  EXAM:  XR CHEST PORT    INDICATION: Chest pain. COMPARISON: Chest x-ray 9/7/2017. TECHNIQUE: Semiupright portable chest AP view    FINDINGS: Cardiac monitoring leads are noted. The cardiac silhouette is within  normal limits. The pulmonary vasculature is within normal limits. The lungs and pleural spaces are clear. The visualized bones and upper abdomen  are age-appropriate. Impression  No acute findings. CT Results  (Last 48 hours)                 11/03/22 0730  CTA CHEST W OR W WO CONT Final result    Impression:  No acute process or evidence of pulmonary embolism. Narrative:  EXAM:  CTA CHEST W OR W WO CONT       INDICATION:   SOB, chest pain, elevated DDimer. R/o PE       COMPARISON: None. TECHNIQUE:    Precontrast  images were obtained to localize the volume for acquisition. Multislice helical CT arteriography was performed from the diaphragm to the   thoracic inlet during uneventful rapid bolus of 100 cc Isovue-370. Lung and soft   tissue windows were generated. Coronal and sagittal images were generated and   3D post processing consisting of coronal maximum intensity images was performed. CT dose reduction was achieved through use of a standardized protocol tailored   for this examination and automatic exposure control for dose modulation. FINDINGS:   CHEST:   THYROID: No nodule. MEDIASTINUM: No mass or lymphadenopathy. SHOSHANA: No mass or lymphadenopathy. THORACIC AORTA: No dissection or aneurysm. MAIN PULMONARY ARTERY: Normal in caliber. TRACHEA/BRONCHI: Patent. ESOPHAGUS: No wall thickening or dilatation. HEART: Normal in size. PLEURA: No effusion or pneumothorax. LUNGS: Emphysematous changes are noted. No acute abnormality. INCIDENTALLY IMAGED UPPER ABDOMEN: Status post cholecystectomy. BONES: No destructive bone lesion. Discharge time spent 35 minutes    Signed:  Briseida Langley MD  11/4/2022  10:17 AM

## 2022-11-04 NOTE — CONSULTS
Electrophysiology Consult    Patient: Clifton Long MRN: [id number]  SSN: [id number]    YOB: 1955  Age: 79 y.o. Sex: male      Subjective:      Clifton Long is a 79 y.o. male who is being seen for afib. Patient with h/o COPD, HTN and PAF. Diagnosed with AF in 2021. Started on metoprolol and Eliquis. Had significant GI bleed on Eliquis and this was stopped. Presented to ER with palpitations. Noted to be in AF. Given diltiazem gtt and converted. No associated CP/syncope. Symptoms moderate in severity. No exacerbating or alleviating factors. Feels well now. Past Medical History:   Diagnosis Date    A-fib [facility])     Chronic obstructive pulmonary disease ([facility] [address]     Hypertension      History reviewed. No pertinent surgical history. History reviewed. No pertinent family history.   Social History     Tobacco Use    Smoking status: Some Days     Types: Cigarettes    Smokeless tobacco: Never   Substance Use Topics    Alcohol use: Not Currently      Current Facility-Administered Medications   Medication Dose Route Frequency Provider Last Rate Last Admin    metoprolol tartrate (LOPRESSOR) tablet 100 mg  100 mg Oral DAILY Regla Rodriguez MD        sodium chloride (NS) flush 5-40 mL  5-40 mL IntraVENous Q8H Iesha Coulter PA-C   10 mL at 11/04/22 0606    sodium chloride (NS) flush 5-40 mL  5-40 mL IntraVENous PRN Iesha Coulter PA-C        acetaminophen (TYLENOL) tablet 650 mg  650 mg Oral Q6H PRN Iesha Coulter PA-C   650 mg at 11/03/22 1013    Or    acetaminophen (TYLENOL) suppository 650 mg  650 mg Rectal Q6H PRN Iesha Coulter PA-C        polyethylene glycol (MIRALAX) packet 17 g  17 g Oral DAILY PRN Iesha Coulter PA-C        ondansetron (ZOFRAN ODT) tablet 4 mg  4 mg Oral Q8H PRN Iesha Coulter PA-C        Or    ondansetron [facility]) injection 4 mg  4 mg IntraVENous Q6H PRN Iesha Coulter PA-C        enoxaparin (LOVENOX) injection 40 mg  40 mg SubCUTAneous DAILY Iesha Coulter PA-C   40 mg at 11/03/22 1013    dilTIAZem (CARDIZEM) injection 5 mg  5 mg IntraVENous ONCE PRN Iesha Coulter PA-C        hydrALAZINE (APRESOLINE) 20 mg/mL injection 20 mg  20 mg IntraVENous Q6H PRN Iesha Coulter PA-C        benzonatate (TESSALON) capsule 100 mg  100 mg Oral TID PRN Iesha Coulter PA-C        lisinopriL (PRINIVIL, ZESTRIL) tablet 20 mg  20 mg Oral DAILY Iesha Coulter PA-C            No Known Allergies    Review of Systems: A comprehensive review of systems was performed. Pertinent positives are listed in the HPI. All other systems are negative.     Objective:     Vitals:    11/03/22 2359 11/04/22 0000 11/04/22 0404 11/04/22 0413   BP: (!) 151/92  (!) 149/85    Pulse: 64 64 73    Resp: 18  18    Temp: 98.3 °F (36.8 °C)  97.8 °F (36.6 °C)    SpO2: 97%  94%    Weight:    75.3 kg (166 lb 0.1 oz)   Height:    5' 7\" (1.702 m)        Physical Exam:  Gen: no acute distress  Eyes: anicteric  Neck: supple  CV: RRR; no murmurs; no edema  Lungs: CTA bilaterally  Abd: soft; NT/ND  Extremities: moves all  Neuro: CN 2-12 grossly intact; AAO x 3  Skin: no rashes    ECG (personally reviewed): AF c RVR and intermittent aberrancy    Assessment:     Hospital Problems  Never Reviewed            Codes Class Noted POA    Atrial fibrillation with rapid ventricular response [facility]) ICD-10-CM: I48.91  ICD-9-CM: 427.31  11/3/2022 Unknown           Plan:     PAF: symptomatic; CHADsVASc = 2 (age, HTN); HAS-BLED = 3 (age, HTN, bleeding)  Hold off on anticoagulation given his history of bleeding and episode of AF < 48 hours  Increase metoprolol to 100 mg bid  Echo  Stress test with Dr. Judy Knox as an outpatient  Would start flecainide if patient has recurrent AF  Discussed [facility] and patient is considering (will arrange follow up to discuss this further)  Stable for discharge from an EP standpoint    HTN: elevated; per cardiology and IM    COPD: stable    GI bleed: remote history; stable    Thanks for the opportunity to participate in Devendra's care. Please call with questions.      Kunal Oliveira MD  Cardiac Electrophysiologist  [facility]  Office: [phone]  Cell: [phone]    Signed By: Barney Russell MD     November 4, 2022

## 2022-11-04 NOTE — PROGRESS NOTES
Problem: Falls - Risk of  Goal: *Absence of Falls  Description: Document Libra Basilio Fall Risk and appropriate interventions in the flowsheet.   Outcome: Progressing Towards Goal  Note: Fall Risk Interventions:            Medication Interventions: Teach patient to arise slowly, Patient to call before getting OOB                   Problem: Patient Education: Go to Patient Education Activity  Goal: Patient/Family Education  Outcome: Progressing Towards Goal     Problem: General Medical Care Plan  Goal: *Vital signs within specified parameters  Outcome: Progressing Towards Goal  Goal: *Labs within defined limits  Outcome: Progressing Towards Goal  Goal: *Absence of infection signs and symptoms  Outcome: Progressing Towards Goal  Goal: *Optimal pain control at patient's stated goal  Outcome: Progressing Towards Goal  Goal: *Skin integrity maintained  Outcome: Progressing Towards Goal  Goal: *Fluid volume balance  Outcome: Progressing Towards Goal  Goal: *Optimize nutritional status  Outcome: Progressing Towards Goal  Goal: *Anxiety reduced or absent  Outcome: Progressing Towards Goal  Goal: *Progressive mobility and function (eg: ADL's)  Outcome: Progressing Towards Goal     Problem: Patient Education: Go to Patient Education Activity  Goal: Patient/Family Education  Outcome: Progressing Towards Goal

## 2022-11-04 NOTE — DISCHARGE INSTRUCTIONS
INSTRUCTIONS ON DISCHARGE     (1) please note that your dose of METOPROLOL was changed to 100mg daily     (2) please follow-up with Dr. Nika Gomez - electrophysiologist in 1 week

## 2025-04-10 ENCOUNTER — HOSPITAL ENCOUNTER (EMERGENCY)
Facility: HOSPITAL | Age: 70
Discharge: HOME | End: 2025-04-10
Payer: OTHER MISCELLANEOUS

## 2025-04-10 VITALS
SYSTOLIC BLOOD PRESSURE: 181 MMHG | HEIGHT: 67 IN | DIASTOLIC BLOOD PRESSURE: 114 MMHG | WEIGHT: 175 LBS | BODY MASS INDEX: 27.47 KG/M2 | RESPIRATION RATE: 15 BRPM | TEMPERATURE: 97.9 F | HEART RATE: 79 BPM | OXYGEN SATURATION: 96 %

## 2025-04-10 DIAGNOSIS — S39.012A LUMBAR STRAIN, INITIAL ENCOUNTER: ICD-10-CM
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
## 2025-04-10 PROCEDURE — 6370000000 HC RX 637 (ALT 250 FOR IP): Performed by: NURSE PRACTITIONER

## 2025-04-10 PROCEDURE — 99283 EMERGENCY DEPT VISIT LOW MDM: CPT

## 2025-04-10 RX ORDER — ACETAMINOPHEN 500 MG
1000 TABLET ORAL
Status: COMPLETED | OUTPATIENT
Start: 2025-04-10 | End: 2025-04-10

## 2025-04-10 RX ORDER — ACETAMINOPHEN 500 MG
500 TABLET ORAL EVERY 6 HOURS PRN
Qty: 28 TABLET | Refills: 0 | Status: SHIPPED | OUTPATIENT
Start: 2025-04-10 | End: 2025-04-17

## 2025-04-10 RX ORDER — LIDOCAINE 4 G/G
1 PATCH TOPICAL
Status: DISCONTINUED | OUTPATIENT
Start: 2025-04-10 | End: 2025-04-10 | Stop reason: HOSPADM

## 2025-04-10 RX ORDER — PREDNISONE 20 MG/1
40 TABLET ORAL
Status: COMPLETED | OUTPATIENT
Start: 2025-04-10 | End: 2025-04-10

## 2025-04-10 RX ORDER — LIDOCAINE 4 G/G
1 PATCH TOPICAL DAILY
Qty: 10 EACH | Refills: 0 | Status: SHIPPED | OUTPATIENT
Start: 2025-04-10 | End: 2025-04-20

## 2025-04-10 RX ORDER — METHOCARBAMOL 750 MG/1
750 TABLET, FILM COATED ORAL EVERY 8 HOURS PRN
Qty: 24 TABLET | Refills: 0 | Status: SHIPPED | OUTPATIENT
Start: 2025-04-10 | End: 2025-04-17

## 2025-04-10 RX ADMIN — PREDNISONE 40 MG: 20 TABLET ORAL at 19:02

## 2025-04-10 RX ADMIN — ACETAMINOPHEN 1000 MG: 500 TABLET, FILM COATED ORAL at 19:02

## 2025-04-10 ASSESSMENT — LIFESTYLE VARIABLES
HOW MANY STANDARD DRINKS CONTAINING ALCOHOL DO YOU HAVE ON A TYPICAL DAY: PATIENT DOES NOT DRINK
HOW OFTEN DO YOU HAVE A DRINK CONTAINING ALCOHOL: NEVER

## 2025-04-10 ASSESSMENT — PAIN DESCRIPTION - ORIENTATION: ORIENTATION: RIGHT;LOWER

## 2025-04-10 ASSESSMENT — PAIN DESCRIPTION - LOCATION: LOCATION: BACK

## 2025-04-10 ASSESSMENT — PAIN SCALES - GENERAL: PAINLEVEL_OUTOF10: 8
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
